# Patient Record
Sex: FEMALE | Race: WHITE | NOT HISPANIC OR LATINO | Employment: FULL TIME | URBAN - METROPOLITAN AREA
[De-identification: names, ages, dates, MRNs, and addresses within clinical notes are randomized per-mention and may not be internally consistent; named-entity substitution may affect disease eponyms.]

---

## 2017-10-03 ENCOUNTER — HOSPITAL ENCOUNTER (EMERGENCY)
Facility: MEDICAL CENTER | Age: 22
End: 2017-10-03
Attending: EMERGENCY MEDICINE
Payer: COMMERCIAL

## 2017-10-03 VITALS
HEART RATE: 76 BPM | DIASTOLIC BLOOD PRESSURE: 85 MMHG | BODY MASS INDEX: 33.31 KG/M2 | HEIGHT: 64 IN | RESPIRATION RATE: 16 BRPM | TEMPERATURE: 96.6 F | WEIGHT: 195.11 LBS | OXYGEN SATURATION: 94 % | SYSTOLIC BLOOD PRESSURE: 135 MMHG

## 2017-10-03 DIAGNOSIS — M79.602 PAIN OF LEFT UPPER EXTREMITY: ICD-10-CM

## 2017-10-03 PROCEDURE — 700111 HCHG RX REV CODE 636 W/ 250 OVERRIDE (IP): Performed by: EMERGENCY MEDICINE

## 2017-10-03 PROCEDURE — 99284 EMERGENCY DEPT VISIT MOD MDM: CPT

## 2017-10-03 PROCEDURE — 93005 ELECTROCARDIOGRAM TRACING: CPT | Performed by: EMERGENCY MEDICINE

## 2017-10-03 PROCEDURE — 96372 THER/PROPH/DIAG INJ SC/IM: CPT

## 2017-10-03 RX ORDER — KETOROLAC TROMETHAMINE 10 MG/1
10 TABLET, FILM COATED ORAL EVERY 6 HOURS PRN
Qty: 22 TAB | Refills: 0 | Status: SHIPPED | OUTPATIENT
Start: 2017-10-03 | End: 2020-12-09

## 2017-10-03 RX ORDER — KETOROLAC TROMETHAMINE 30 MG/ML
60 INJECTION, SOLUTION INTRAMUSCULAR; INTRAVENOUS ONCE
Status: COMPLETED | OUTPATIENT
Start: 2017-10-03 | End: 2017-10-03

## 2017-10-03 RX ORDER — HYDROCODONE BITARTRATE AND ACETAMINOPHEN 5; 325 MG/1; MG/1
1-2 TABLET ORAL EVERY 6 HOURS PRN
Qty: 10 TAB | Refills: 0 | Status: SHIPPED | OUTPATIENT
Start: 2017-10-03 | End: 2020-12-09

## 2017-10-03 RX ADMIN — KETOROLAC TROMETHAMINE 60 MG: 30 INJECTION, SOLUTION INTRAMUSCULAR at 05:08

## 2017-10-03 NOTE — ED NOTES
Chief Complaint   Patient presents with   • Arm Pain     left.  increases with movement.  denies recent trauma.     Triage process explained to patient.  Pt back to waiting room.  Pt instructed to inform RN if any changes or questions arise.

## 2017-10-03 NOTE — ED PROVIDER NOTES
ED Provider Note    CHIEF COMPLAINT  Chief Complaint   Patient presents with   • Arm Pain     left.  increases with movement.  denies recent trauma.       HPI  Pippa Mcgill is a 22 y.o. female who presentsWith arm pain, left arm pain since 2 AM this morning, woke her up. Pain was severe dull, not worse with motion, no neck pain. No similar episodes. No chest pain or shortness of breath no fever no chills no diaphoresis. 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 Denies any neck pain or arm injury at that time    REVIEW OF SYSTEMS  See HPI for further details. History of asthma, depression, sympathetic reflex dystrophy All other systems are negative.     PAST MEDICAL HISTORY  Past Medical History:   Diagnosis Date   • ASTHMA     with exercise    • Pain    • Premature baby     33 wks   • Psychiatric problem     depression/anxiety   • Sympathetic reflex dystrophy        FAMILY HISTORY  Family History   Problem Relation Age of Onset   • Non-contributory Neg Hx        SOCIAL HISTORY  Social History     Social History   • Marital status: Single     Spouse name: N/A   • Number of children: N/A   • Years of education: N/A     Social History Main Topics   • Smoking status: Never Smoker   • Smokeless tobacco: Never Used   • Alcohol use Yes      Comment: occasionally    • Drug use: No   • Sexual activity: Not on file     Other Topics Concern   • Not on file     Social History Narrative   • No narrative on file       SURGICAL HISTORY  Past Surgical History:   Procedure Laterality Date   • COLONOSCOPY  6/14/2012    Performed by MARIAH BIRMINGHAM at SURGERY SAME DAY [facility] ORS   • KNEE ARTHROSCOPY  12/22/2011    Performed by PRAVIN ANG at SURGERY [facility] ORS   • TIBIAL OSTEOTOMY  12/22/2011    Performed by PRAVIN ANG at [facility] ORS   • LIGAMENT RECONSTRUCTION  12/22/2011    Performed by PRAVIN ANG at [facility] ORS   • OTHER ORTHOPEDIC SURGERY    "      CURRENT MEDICATIONS  Home Medications     Reviewed by Yue Wright R.N. (Registered Nurse) on 10/03/17 at 0427  Med List Status: Partial   Medication Last Dose Status   hydrocodone-acetaminophen (NORCO) 5-325 MG TABS per tablet  Active   ibuprofen (MOTRIN) 200 MG TABS Not Taking Active   oxymorphone (OPANA) 10 MG tablet 7/6/2014 Active                ALLERGIES  Allergies   Allergen Reactions   • Nkda [No Known Drug Allergy]        PHYSICAL EXAM  VITAL SIGNS: /85   Pulse 76   Temp 35.9 °C (96.6 °F)   Resp 15   Ht 1.626 m (5' 4\")   Wt 88.5 kg (195 lb 1.7 oz)   SpO2 94%   BMI 33.49 kg/m²   Constitutional: Well developed, Well nourished, No acute distress, Non-toxic appearance.   Extremities: Intact distal pulses, No edema, No tenderness, No cyanosis, No clubbing.   Musculoskeletal: Good range of motion in all major joints. No tenderness to palpation or major deformities noted.   Neurologic: Alert & oriented x 3, Normal motor function, Normal sensory function, No focal deficits noted.   Psychiatric: Affect normal, Judgment normal, Mood normal.   EKG Interpretation    Interpreted by me    Rhythm: normal sinus   Rate: normal  Axis: normal  Ectopy: none  Conduction: normal  ST Segments: no acute change  T Waves: no acute change  Q Waves: none    Clinical Impression: I do not have an old EKG to compare to      RADIOLOGY/PROCEDURES      COURSE & MEDICAL DECISION MAKING  Pertinent Labs & Imaging studies reviewed. (See chart for details)    She woke up with arm pain last night, no chest pain. Does have a brother and uncles with heart disease and EKG will be done.. I think this is probably, most likely neuropathy. Will be treated, with Norco, Toradol,     FINAL IMPRESSION  1.   1. Pain of left upper extremity        2.   3.     Disposition  Discharge instructions are understood. This patient is to return if fever vomiting or no better in 12 hours. Follow up with the [facility] clinic or private physician. " Information sheets onUpper arm pain, neuropathy.  Electronically signed by: Julian Stafford, 10/3/2017 4:49 AM

## 2017-10-03 NOTE — ED NOTES
Patient was educated on discharge instructions.  Patient was informed about diagnosis, symptom management, risks, and home care instructions.  Patient verbalized understanding and signed discharge instructions. Prescriptions handed to patient. Copy of discharge instructions in chart.  Patient ambulated out with steady gait.  Patient has personal belongings .

## 2017-10-03 NOTE — DISCHARGE INSTRUCTIONS
Musculoskeletal Pain  Musculoskeletal pain is muscle and eric aches and pains. These pains can occur in any part of the body. Your caregiver may treat you without knowing the cause of the pain. They may treat you if blood or urine tests, X-rays, and other tests were normal.   CAUSES  There is often not a definite cause or reason for these pains. These pains may be caused by a type of germ (virus). The discomfort may also come from overuse. Overuse includes working out too hard when your body is not fit. Eric aches also come from weather changes. Bone is sensitive to atmospheric pressure changes.  HOME CARE INSTRUCTIONS   · Ask when your test results will be ready. Make sure you get your test results.  · Only take over-the-counter or prescription medicines for pain, discomfort, or fever as directed by your caregiver. If you were given medications for your condition, do not drive, operate machinery or power tools, or sign legal documents for 24 hours. Do not drink alcohol. Do not take sleeping pills or other medications that may interfere with treatment.  · Continue all activities unless the activities cause more pain. When the pain lessens, slowly resume normal activities. Gradually increase the intensity and duration of the activities or exercise.  · During periods of severe pain, bed rest may be helpful. Lay or sit in any position that is comfortable.  · Putting ice on the injured area.  ¨ Put ice in a bag.  ¨ Place a towel between your skin and the bag.  ¨ Leave the ice on for 15 to 20 minutes, 3 to 4 times a day.  · Follow up with your caregiver for continued problems and no reason can be found for the pain. If the pain becomes worse or does not go away, it may be necessary to repeat tests or do additional testing. Your caregiver may need to look further for a possible cause.  SEEK IMMEDIATE MEDICAL CARE IF:  · You have pain that is getting worse and is not relieved by medications.  · You develop chest pain  that is associated with shortness or breath, sweating, feeling sick to your stomach (nauseous), or throw up (vomit).  · Your pain becomes localized to the abdomen.  · You develop any new symptoms that seem different or that concern you.  MAKE SURE YOU:   · Understand these instructions.  · Will watch your condition.  · Will get help right away if you are not doing well or get worse.     This information is not intended to replace advice given to you by your health care provider. Make sure you discuss any questions you have with your health care provider.     Document Released: 12/18/2006 Document Revised: 03/11/2013 Document Reviewed: 08/22/2014  [employer] Interactive Patient Education ©2016 [employer]  Return if fever, vomiting or if no better in 12 hours.

## 2017-10-20 LAB — EKG IMPRESSION: NORMAL

## 2018-01-28 ENCOUNTER — OFFICE VISIT (OUTPATIENT)
Dept: URGENT CARE | Facility: CLINIC | Age: 23
End: 2018-01-28
Payer: COMMERCIAL

## 2018-01-28 VITALS
HEART RATE: 87 BPM | DIASTOLIC BLOOD PRESSURE: 64 MMHG | SYSTOLIC BLOOD PRESSURE: 102 MMHG | BODY MASS INDEX: 32.44 KG/M2 | TEMPERATURE: 98 F | WEIGHT: 190 LBS | RESPIRATION RATE: 14 BRPM | OXYGEN SATURATION: 96 % | HEIGHT: 64 IN

## 2018-01-28 DIAGNOSIS — L50.9 URTICARIA: ICD-10-CM

## 2018-01-28 PROCEDURE — 99203 OFFICE O/P NEW LOW 30 MIN: CPT | Performed by: FAMILY MEDICINE

## 2018-01-28 RX ORDER — METHYLPREDNISOLONE 4 MG/1
TABLET ORAL
Qty: 21 TAB | Refills: 0 | Status: SHIPPED | OUTPATIENT
Start: 2018-01-28 | End: 2020-12-09

## 2018-01-28 RX ORDER — DEXAMETHASONE SODIUM PHOSPHATE 4 MG/ML
2 INJECTION, SOLUTION INTRA-ARTICULAR; INTRALESIONAL; INTRAMUSCULAR; INTRAVENOUS; SOFT TISSUE ONCE
Status: COMPLETED | OUTPATIENT
Start: 2018-01-28 | End: 2018-01-28

## 2018-01-28 RX ADMIN — DEXAMETHASONE SODIUM PHOSPHATE 2 MG: 4 INJECTION, SOLUTION INTRA-ARTICULAR; INTRALESIONAL; INTRAMUSCULAR; INTRAVENOUS; SOFT TISSUE at 11:23

## 2018-01-28 ASSESSMENT — ENCOUNTER SYMPTOMS
VOMITING: 0
SORE THROAT: 0
SPUTUM PRODUCTION: 0
COUGH: 0
SINUS PAIN: 0
EYE REDNESS: 0
PALPITATIONS: 0
CHILLS: 0
DIARRHEA: 0
EYE DISCHARGE: 0
FEVER: 0
NAUSEA: 0
ABDOMINAL PAIN: 0

## 2018-01-28 NOTE — PATIENT INSTRUCTIONS
Rash  A rash is a change in the color or texture of the skin. There are many different types of rashes. You may have other problems that accompany your rash.  CAUSES   · Infections.  · Allergic reactions. This can include allergies to pets or foods.  · Certain medicines.  · Exposure to certain chemicals, soaps, or cosmetics.  · Heat.  · Exposure to poisonous plants.  · Tumors, both cancerous and noncancerous.  SYMPTOMS   · Redness.  · Scaly skin.  · Itchy skin.  · Dry or cracked skin.  · Bumps.  · Blisters.  · Pain.  DIAGNOSIS   Your caregiver may do a physical exam to determine what type of rash you have. A skin sample (biopsy) may be taken and examined under a microscope.  TREATMENT   Treatment depends on the type of rash you have. Your caregiver may prescribe certain medicines. For serious conditions, you may need to see a skin doctor (dermatologist).  HOME CARE INSTRUCTIONS   · Avoid the substance that caused your rash.  · Do not scratch your rash. This can cause infection.  · You may take cool baths to help stop itching.  · Only take over-the-counter or prescription medicines as directed by your caregiver.  · Keep all follow-up appointments as directed by your caregiver.  SEEK IMMEDIATE MEDICAL CARE IF:  · You have increasing pain, swelling, or redness.  · You have a fever.  · You have new or severe symptoms.  · You have body aches, diarrhea, or vomiting.  · Your rash is not better after 3 days.  MAKE SURE YOU:  · Understand these instructions.  · Will watch your condition.  · Will get help right away if you are not doing well or get worse.     This information is not intended to replace advice given to you by your health care provider. Make sure you discuss any questions you have with your health care provider.     Document Released: 12/08/2003 Document Revised: 01/08/2016 Document Reviewed: 10/01/2012  [employer] Interactive Patient Education ©2016 [employer]

## 2018-01-28 NOTE — PROGRESS NOTES
Subjective:      Pippa Mcgill is a 22 y.o. female who presents with Urticaria (x 3-4 days all over body)            Subjective:     Pippa Mcgill is a 22 y.o. female who presents for evaluation of rash. Rash started 3 days ago. Initial distribution include arms , trunk and legs.  Rash has changed over time.  Rash pruritic, appears to be allergic reaction. Associated symptoms: no associated symptoms. Patient denies: fever, cough, congestion, sore throat, arthralgia, nausea , vomiting, myalgia. Patient has not had previous evaluation of rash. Patient has not had previous treatment. Patient has not had contacts with similar rash. Patient has had new exposures. They have changed washing detergent last week.      Past Medical History:  No date: ASTHMA      Comment: with exercise   No date: Pain  No date: Premature baby      Comment: 33 wks  No date: Psychiatric problem      Comment: depression/anxiety  No date: Sympathetic reflex dystrophy  There are no active problems to display for this patient.    Past Surgical History:  6/14/2012: COLONOSCOPY      Comment: Performed by MARIAH BIRMINGHAM at SURGERY SAME                DAY [facility] ORS  12/22/2011: KNEE ARTHROSCOPY      Comment: Performed by PRAVIN ANG at SURGERY [facility] ORS  12/22/2011: TIBIAL OSTEOTOMY      Comment: Performed by PRAVIN ANG at [facility] ORS  12/22/2011: LIGAMENT RECONSTRUCTION      Comment: Performed by PRAVIN ANG at [facility] ORS  No date: OTHER ORTHOPEDIC SURGERY  Review of patient's family history indicates:    Non-contributory               Neg Hx                    Social History    Marital status: Single              Spouse name:                       Years of education:                 Number of children:               Social History Main Topics    Smoking status: Never Smoker                                                                Smokeless tobacco: Never  "Used                        Alcohol use: Yes                Comment: occasionally     Drug use: No              Current Outpatient Prescriptions:  DiphenhydrAMINE HCl (BENADRYL ALLERGY PO), Take  by mouth., Disp: , Rfl:   ketorolac (TORADOL) 10 MG Tab, Take 1 Tab by mouth every 6 hours as needed for Mild Pain for up to 22 doses., Disp: 22 Tab, Rfl: 0  hydrocodone-acetaminophen (NORCO) 5-325 MG Tab per tablet, Take 1-2 Tabs by mouth every 6 hours as needed., Disp: 10 Tab, Rfl: 0  oxymorphone (OPANA) 10 MG tablet, Take 10 mg by mouth every four hours as needed., Disp: , Rfl:   hydrocodone-acetaminophen (NORCO) 5-325 MG TABS per tablet, Take 1 Tab by mouth every four hours as needed., Disp: 15 Each, Rfl: 0  ibuprofen (MOTRIN) 200 MG TABS, Take 200 mg by mouth every 6 hours as needed., Disp: , Rfl:     No current facility-administered medications for this visit.      -- Nkda (No Known Drug Allergy)                     Review of Systems   Constitutional: Negative for chills, fever and malaise/fatigue.   HENT: Negative for congestion, sinus pain and sore throat.    Eyes: Negative for discharge and redness.   Respiratory: Negative for cough and sputum production.    Cardiovascular: Negative for chest pain and palpitations.   Gastrointestinal: Negative for abdominal pain, diarrhea, nausea and vomiting.   Skin: Positive for itching and rash.          Objective:     /64   Pulse 87   Temp 36.7 °C (98 °F)   Resp 14   Ht 1.626 m (5' 4\")   Wt 86.2 kg (190 lb)   SpO2 96%   BMI 32.61 kg/m²      Physical Exam   Constitutional: She is oriented to person, place, and time. She appears well-developed and well-nourished.   HENT:   Head: Normocephalic.   Right Ear: External ear normal.   Left Ear: External ear normal.   Eyes: EOM are normal. Pupils are equal, round, and reactive to light. Right eye exhibits no discharge. Left eye exhibits no discharge.   Neck: Normal range of motion. Neck supple.   Cardiovascular: Normal " rate, regular rhythm and normal heart sounds.    Pulmonary/Chest: Effort normal and breath sounds normal.   Abdominal: Soft. Bowel sounds are normal.   Neurological: She is alert and oriented to person, place, and time.   Skin: Skin is warm. Rash noted. No purpura noted. Rash is papular and urticarial. Rash is not vesicular.               Assessment/Plan:     Assessment:    Urticaria     Plan:    Aveeno baths  Benadryl prn for itching.  Information on the above diagnosis was given to the patient.  Observe for signs of superimposed infection and systemic symptoms  Rx: medrol dose pack

## 2019-01-13 ENCOUNTER — OFFICE VISIT (OUTPATIENT)
Dept: URGENT CARE | Facility: CLINIC | Age: 24
End: 2019-01-13
Payer: COMMERCIAL

## 2019-01-13 VITALS
DIASTOLIC BLOOD PRESSURE: 76 MMHG | SYSTOLIC BLOOD PRESSURE: 118 MMHG | RESPIRATION RATE: 16 BRPM | HEART RATE: 82 BPM | TEMPERATURE: 98.4 F | OXYGEN SATURATION: 97 %

## 2019-01-13 DIAGNOSIS — T78.3XXA ANGIOEDEMA OF LIPS, INITIAL ENCOUNTER: ICD-10-CM

## 2019-01-13 PROCEDURE — 99203 OFFICE O/P NEW LOW 30 MIN: CPT | Performed by: NURSE PRACTITIONER

## 2019-01-13 RX ORDER — METHYLPREDNISOLONE 4 MG/1
TABLET ORAL
Qty: 1 KIT | Refills: 0 | Status: SHIPPED | OUTPATIENT
Start: 2019-01-13 | End: 2020-12-09

## 2019-01-13 ASSESSMENT — ENCOUNTER SYMPTOMS
CHILLS: 0
FACIAL SWELLING: 1
FEVER: 0

## 2019-01-13 NOTE — PROGRESS NOTES
Subjective:      Pippa Mcgill is a 23 y.o. female who presents with Facial Swelling (x1 day, lip swelling, has gone down since but still feel inflamed)    Past Medical History:   Diagnosis Date   • ASTHMA     with exercise    • Pain    • Premature baby     33 wks   • Psychiatric problem     depression/anxiety   • Sympathetic reflex dystrophy      Social History     Social History   • Marital status: Single     Spouse name: N/A   • Number of children: N/A   • Years of education: N/A     Occupational History   • Not on file.     Social History Main Topics   • Smoking status: Never Smoker   • Smokeless tobacco: Never Used   • Alcohol use Yes      Comment: occasionally    • Drug use: No   • Sexual activity: Not on file     Other Topics Concern   • Not on file     Social History Narrative   • No narrative on file     Family History   Problem Relation Age of Onset   • Non-contributory Neg Hx        Allergies: Nkda [no known drug allergy]    Patient is a 23-year-old female who presents today with complaint of intermittent swelling of her lips over the last 2 days.  States day before yesterday she woke up during the night with her lip swollen.  She tried putting ice on them and this seemed to help.  It happened again yesterday but not to the same extreme.  She denies any other rash itching or hives.  No tightness in her throat or chest, no difficulty breathing, and no swelling of the tongue.  States this did start after she began using some Chapstick.  States she has since stopped using that.          Facial Swelling   This is a new problem. The current episode started in the past 7 days. The problem occurs intermittently. The problem has been gradually improving. Pertinent negatives include no chills or fever. Nothing aggravates the symptoms. She has tried nothing for the symptoms. The treatment provided no relief.       Review of Systems   Constitutional: Positive for malaise/fatigue. Negative for chills and fever.    HENT: Positive for facial swelling.         Facial swelling   Skin: Negative.    All other systems reviewed and are negative.         Objective:     There were no vitals taken for this visit.     Physical Exam   Constitutional: She is oriented to person, place, and time. She appears well-developed and well-nourished.   HENT:   Head: Normocephalic.   Nose: Nose normal.   Mouth/Throat: Oropharynx is clear and moist. No oropharyngeal exudate.   Lips do not appear to be swollen at this time.   Eyes: EOM are normal.   Neck: Normal range of motion. Neck supple.   Cardiovascular: Normal rate and regular rhythm.    Pulmonary/Chest: Effort normal and breath sounds normal.   Neurological: She is alert and oriented to person, place, and time.   Skin: Skin is warm and dry. No rash noted.   Psychiatric: She has a normal mood and affect. Her behavior is normal.   Vitals reviewed.    Discussed with patient that I suspect she may have experienced angioedema which is an allergy type response.  I have advised her to start taking antihistamines and I will send a Medrol Dosepak to the patient's pharmacy if this occurs again I have asked her to start taking it.  Strict ER precautions given for swelling of the tongue or tightness in throat or chest or difficulty breathing.  Patient verbalized understanding and agreement with plan of care.          Assessment/Plan:   Angioedema, intermittent    Antihistamine of choic otc  Medrol dose lindsay  ER precautions for swelling of tongue, tightness in throat or SOB    There are no diagnoses linked to this encounter.

## 2019-02-08 ENCOUNTER — HOSPITAL ENCOUNTER (EMERGENCY)
Facility: MEDICAL CENTER | Age: 24
End: 2019-02-15
Payer: COMMERCIAL

## 2019-12-23 ENCOUNTER — OFFICE VISIT (OUTPATIENT)
Dept: URGENT CARE | Facility: CLINIC | Age: 24
End: 2019-12-23
Payer: COMMERCIAL

## 2019-12-23 VITALS
TEMPERATURE: 97.6 F | HEIGHT: 64 IN | OXYGEN SATURATION: 97 % | SYSTOLIC BLOOD PRESSURE: 120 MMHG | WEIGHT: 213 LBS | HEART RATE: 83 BPM | DIASTOLIC BLOOD PRESSURE: 76 MMHG | RESPIRATION RATE: 16 BRPM | BODY MASS INDEX: 36.37 KG/M2

## 2019-12-23 DIAGNOSIS — R21 RASH: ICD-10-CM

## 2019-12-23 DIAGNOSIS — L23.9 ALLERGIC CONTACT DERMATITIS, UNSPECIFIED TRIGGER: ICD-10-CM

## 2019-12-23 PROCEDURE — 99214 OFFICE O/P EST MOD 30 MIN: CPT | Performed by: NURSE PRACTITIONER

## 2019-12-23 RX ORDER — TRIAMCINOLONE ACETONIDE 1 MG/G
1 CREAM TOPICAL 2 TIMES DAILY
Qty: 1 TUBE | Refills: 0 | Status: SHIPPED | OUTPATIENT
Start: 2019-12-23 | End: 2020-12-09

## 2019-12-23 ASSESSMENT — ENCOUNTER SYMPTOMS
FATIGUE: 0
DIZZINESS: 0
SHORTNESS OF BREATH: 0
CHILLS: 0
FEVER: 0
VOMITING: 0
RHINORRHEA: 0
MYALGIAS: 0
EYE PAIN: 0
NAUSEA: 0
DIARRHEA: 0
SORE THROAT: 0

## 2019-12-23 NOTE — PROGRESS NOTES
"Subjective:   Pippa Mcgill  is a 24 y.o. female who presents for Rash (x2 days, rash under both underarms. itchy and burning. Started a new deodrant)        Rash   This is a new problem. Episode onset: 2 days. The problem is unchanged. The affected locations include the left axilla and right axilla. The rash is characterized by redness and itchiness. Associated with: Started a new deodorant. Pertinent negatives include no congestion, diarrhea, eye pain, facial edema, fatigue, fever, rhinorrhea, shortness of breath, sore throat or vomiting. Past treatments include nothing. The treatment provided no relief. Her past medical history is significant for allergies. There is no history of eczema.     Review of Systems   Constitutional: Negative for chills, fatigue and fever.   HENT: Negative for congestion, rhinorrhea and sore throat.    Eyes: Negative for pain.   Respiratory: Negative for shortness of breath.    Cardiovascular: Negative for chest pain.   Gastrointestinal: Negative for diarrhea, nausea and vomiting.   Genitourinary: Negative for hematuria.   Musculoskeletal: Negative for myalgias.   Skin: Positive for itching and rash.   Neurological: Negative for dizziness.     Allergies   Allergen Reactions   • Nkda [No Known Drug Allergy]       Objective:   /76   Pulse 83   Temp 36.4 °C (97.6 °F) (Temporal)   Resp 16   Ht 1.626 m (5' 4\")   Wt 96.6 kg (213 lb)   SpO2 97%   BMI 36.56 kg/m²   Physical Exam  Vitals signs and nursing note reviewed.   Constitutional:       General: She is not in acute distress.     Appearance: She is well-developed.   HENT:      Head: Normocephalic and atraumatic.   Eyes:      Conjunctiva/sclera: Conjunctivae normal.      Pupils: Pupils are equal, round, and reactive to light.   Cardiovascular:      Rate and Rhythm: Normal rate and regular rhythm.      Heart sounds: No murmur.   Pulmonary:      Effort: Pulmonary effort is normal. No respiratory distress.      Breath sounds: " Normal breath sounds.   Abdominal:      General: There is no distension.      Palpations: Abdomen is soft.      Tenderness: There is no tenderness.   Musculoskeletal: Normal range of motion.   Skin:     General: Skin is warm and dry.      Findings: Rash present. Rash is macular and urticarial.   Neurological:      General: No focal deficit present.      Mental Status: She is alert and oriented to person, place, and time. Mental status is at baseline.      Gait: Gait (gait at baseline) normal.   Psychiatric:         Judgment: Judgment normal.           Assessment/Plan:     1. Allergic contact dermatitis, unspecified trigger  triamcinolone acetonide (KENALOG) 0.1 % Cream   2. Rash  triamcinolone acetonide (KENALOG) 0.1 % Cream     Encourage patient to use over-the-counter Zyrtec.  Advised to avoid deodorant.  Patient given precautionary s/sx that mandate immediate follow up and evaluation in the ED. Advised of risks of not doing so.    DDX, Supportive care, and indications for immediate follow-up discussed with patient.    Instructed to return to clinic or nearest emergency department if we are not available for any change in condition, further concerns, or worsening of symptoms.    The patient demonstrated a good understanding and agreed with the treatment plan.

## 2020-10-29 ENCOUNTER — NURSE TRIAGE (OUTPATIENT)
Dept: HEALTH INFORMATION MANAGEMENT | Facility: OTHER | Age: 25
End: 2020-10-29

## 2020-10-29 NOTE — TELEPHONE ENCOUNTER
1. Caller Name: bud               Call Back Number: [phone]   [facility] PCP or Specialty Provider: Johanna Brown        2.  In the last two weeks, has the patient had any new or worsening symptoms (not explained by alternative diagnosis)? Yes, the patient reports the following COVID-19 consistent symptoms: cough.    3.  Does patient have any comoribidities? None     4.  Has the patient traveled in the last 14 days OR had any known contact with someone who is suspected or confirmed to have COVID-19?  Yes, 10/25/2020 spent 4 hours with covid positive person at family member home.    5. POTENTIAL PUI (LOW): Advised to perform self care, monitor for worsening symptoms and to call back in 3 days if no improvement. Instructed to self isolate and contact [facility] at [phone]    Information provided on testing site    Note routed to [facility] Provider: ORLY only.         Reason for Disposition  • [1] COVID-19 EXPOSURE (Close Contact) AND [2] within last 14 days AND [3] NO cough, fever, or breathing difficulty    Additional Information  • Negative: SEVERE difficulty breathing (e.g., struggling for each breath, speak in single words, bluish lips)  • Negative: Sounds like a life-threatening emergency to the triager  • Negative: [1] Adult has symptoms of COVID-19 (fever, cough, or SOB) AND [2] lab test positive  • Negative: [1] Adult has symptoms of COVID-19 (fever, cough or SOB) AND [2] major community spread where patient lives AND [3] testing not being done for mild symptoms  • Negative: [1] Difficulty breathing (shortness of breath) occurs AND [2] onset > 14 days after COVID-19 EXPOSURE (Close Contact) AND [3] no major community spread  • Negative: [1] Cough occurs AND [2] onset > 14 days after COVID-19 EXPOSURE AND [3] no major community spread  • Negative: [1] Common cold symptoms AND [2] onset > 14 days after COVID-19 EXPOSURE AND [3] no major community spread  • Negative: [1] Difficulty breathing occurs AND [2] within  "14 days of COVID-19 EXPOSURE (Close Contact)  • Negative: Patient sounds very sick or weak to the triager  • Negative: [1] Fever (or feeling feverish) OR cough AND [2] within 14 Days of COVID-19 EXPOSURE (Close Contact)  • Negative: [1] Fever (or feeling feverish) OR cough occurs AND [2] within 14 days of travel from another country (international travel)  • Negative: [1] Fever (or feeling feverish) OR cough occurs AND [2] within 14 days of travel from a city or area with major community spread  • Negative: [1] Fever (or feeling feverish) OR cough occurs AND [2] living in area with major community spread AND [3] testing being done in the community for symptoms  • Negative: [1] Mild body aches, chills, diarrhea, headache, runny nose, or sore throat AND [2] within 14 days of COVID-19 EXPOSURE  • Negative: [1] COVID-19 EXPOSURE within last 14 days AND [2] NO cough, fever, or breathing difficulty AND [3] exposed person is a healthcare worker who was NOT using all recommended personal protective equipment (i.e., a respirator-N95 mask, eye protection, gloves, and gown)    Answer Assessment - Initial Assessment Questions  1. CLOSE CONTACT: \"Who is the person with the confirmed or suspected COVID-19 infection that you were exposed to?\"      confirmed  2. PLACE of CONTACT: \"Where were you when you were exposed to COVID-19?\" (e.g., home, school, medical waiting room; which city?)      Family members home  3. TYPE of CONTACT: \"How much contact was there?\" (e.g., sitting next to, live in same house, work in same office, same building)      Sitting by each other  4. DURATION of CONTACT: \"How long were you in contact with the COVID-19 patient?\" (e.g., a few seconds, passed by person, a few minutes, live with the patient)     About 4 hours  5. DATE of CONTACT: \"When did you have contact with a COVID-19 patient?\" (e.g., how many days ago)   10/25  6. TRAVEL: \"Have you traveled out of the country recently?\" If so, \"When and " "where?\"      * Also ask about out-of-state travel, since the [facility] has identified some high risk cities for community spread in the .      * Note: Travel becomes less relevant if there is widespread community transmission where the patient lives.  no  7. COMMUNITY SPREAD: \"Are there lots of cases of COVID-19 (community spread) where you live?\" (See public health department website, if unsure)    * MAJOR community spread: high number of cases; numbers of cases are increasing; many people hospitalized.    * MINOR community spread: low number of cases; not increasing; few or no people hospitalized      yes  8. SYMPTOMS: \"Do you have any symptoms?\" (e.g., fever, cough, breathing difficulty)      no  9. PREGNANCY OR POSTPARTUM: \"Is there any chance you are pregnant?\" \"When was your last menstrual period?\" \"Did you deliver in the last 2 weeks?\"      no  10. HIGH RISK: \"Do you have any heart or lung problems? Do you have a weak immune system?\" (e.g., CHF, COPD, asthma, HIV positive, chemotherapy, renal failure, diabetes mellitus, sickle cell anemia)        no    Protocols used: CORONAVIRUS (COVID-19) EXPOSURE-A-OH      "

## 2020-12-09 ENCOUNTER — OFFICE VISIT (OUTPATIENT)
Dept: URGENT CARE | Facility: CLINIC | Age: 25
End: 2020-12-09
Payer: COMMERCIAL

## 2020-12-09 ENCOUNTER — HOSPITAL ENCOUNTER (OUTPATIENT)
Facility: MEDICAL CENTER | Age: 25
End: 2020-12-09
Attending: PHYSICIAN ASSISTANT
Payer: COMMERCIAL

## 2020-12-09 VITALS
HEIGHT: 64 IN | WEIGHT: 203 LBS | SYSTOLIC BLOOD PRESSURE: 118 MMHG | HEART RATE: 104 BPM | TEMPERATURE: 98 F | RESPIRATION RATE: 17 BRPM | DIASTOLIC BLOOD PRESSURE: 70 MMHG | BODY MASS INDEX: 34.66 KG/M2 | OXYGEN SATURATION: 97 %

## 2020-12-09 DIAGNOSIS — Z20.822 SUSPECTED COVID-19 VIRUS INFECTION: ICD-10-CM

## 2020-12-09 DIAGNOSIS — R52 GENERALIZED BODY ACHES: ICD-10-CM

## 2020-12-09 DIAGNOSIS — R51.9 ACUTE NONINTRACTABLE HEADACHE, UNSPECIFIED HEADACHE TYPE: ICD-10-CM

## 2020-12-09 DIAGNOSIS — R05.9 COUGH: ICD-10-CM

## 2020-12-09 DIAGNOSIS — R50.9 CHILLS WITH FEVER: ICD-10-CM

## 2020-12-09 PROCEDURE — 99214 OFFICE O/P EST MOD 30 MIN: CPT | Mod: CS | Performed by: PHYSICIAN ASSISTANT

## 2020-12-09 PROCEDURE — U0003 INFECTIOUS AGENT DETECTION BY NUCLEIC ACID (DNA OR RNA); SEVERE ACUTE RESPIRATORY SYNDROME CORONAVIRUS 2 (SARS-COV-2) (CORONAVIRUS DISEASE [COVID-19]), AMPLIFIED PROBE TECHNIQUE, MAKING USE OF HIGH THROUGHPUT TECHNOLOGIES AS DESCRIBED BY CMS-2020-01-R: HCPCS

## 2020-12-09 ASSESSMENT — ENCOUNTER SYMPTOMS
DIARRHEA: 0
COUGH: 1
FEVER: 1
SORE THROAT: 1
WHEEZING: 0
VOMITING: 0
SHORTNESS OF BREATH: 0
NAUSEA: 0
CHILLS: 1
HEADACHES: 1
MYALGIAS: 1
EYE DISCHARGE: 0
SPUTUM PRODUCTION: 0

## 2020-12-10 NOTE — PROGRESS NOTES
Subjective:      Pippa Mcgill is a 25 y.o. female who presents with Cough (body aches, headache, chills, x1day)    Medications:    • BENADRYL ALLERGY PO  • ibuprofen Tabs    Allergies: Nkda [no known drug allergy]    Problem List: Pippa Mcgill does not have a problem list on file.    Surgical History:  Past Surgical History:   Procedure Laterality Date   • COLONOSCOPY  6/14/2012    Performed by MARIAH BIRMINGHAM at SURGERY SAME DAY [facility] ORS   • KNEE ARTHROSCOPY  12/22/2011    Performed by PRAVIN ANG at SURGERY [facility] ORS   • TIBIAL OSTEOTOMY  12/22/2011    Performed by PRAVIN ANG at SURGERY [facility] ORS   • LIGAMENT RECONSTRUCTION  12/22/2011    Performed by PRAVIN ANG at SURGERY [facility] ORS   • OTHER ORTHOPEDIC SURGERY         Past Social Hx: Pippa Mcgill  reports that she has never smoked. She has never used smokeless tobacco. She reports current alcohol use. She reports that she does not use drugs.     Past Family Hx:  Pippa Mcgill family history is not on file.     Problem list, medications, and allergies reviewed by myself today in Epic.         Patient presents with:  Cough: body aches, headache, chills, x1day.  Pt works outside of the home, does wear a mask.  Patient has had a number of people at work test positive for Covid.  Patient is requesting a Covid test based on her symptoms.  Patient has been taking over-the-counter Tylenol DayQuil and NyQuil with little relief.        Cough  Associated symptoms include chills, ear pain, a fever, headaches, myalgias and a sore throat. Pertinent negatives include no chest pain, shortness of breath or wheezing. Exacerbated by: exertion. She has tried OTC cough suppressant for the symptoms. The treatment provided no relief. Her past medical history is significant for bronchitis and environmental allergies. There is no history of asthma.       Review of Systems   Constitutional: Positive for chills, fever and malaise/fatigue.  "  HENT: Positive for congestion, ear pain and sore throat.    Eyes: Negative for discharge.   Respiratory: Positive for cough. Negative for sputum production, shortness of breath and wheezing.    Cardiovascular: Negative for chest pain.   Gastrointestinal: Negative for diarrhea, nausea and vomiting.   Musculoskeletal: Positive for myalgias.   Neurological: Positive for headaches.   Endo/Heme/Allergies: Positive for environmental allergies.   All other systems reviewed and are negative.         Objective:     /70 (BP Location: Left arm, Patient Position: Sitting, BP Cuff Size: Adult)   Pulse (!) 104   Temp 36.7 °C (98 °F) (Temporal)   Resp 17   Ht 1.626 m (5' 4\")   Wt 92.1 kg (203 lb)   LMP 12/04/2020   SpO2 97%   Breastfeeding No   BMI 34.84 kg/m²      Physical Exam  Vitals signs and nursing note reviewed.   Constitutional:       General: She is not in acute distress.     Appearance: Normal appearance. She is well-developed and overweight. She is ill-appearing. She is not toxic-appearing or diaphoretic.   HENT:      Head: Normocephalic and atraumatic.      Right Ear: Tympanic membrane normal.      Left Ear: Tympanic membrane normal.      Nose: Mucosal edema, congestion and rhinorrhea present.      Mouth/Throat:      Mouth: Mucous membranes are moist.      Pharynx: Uvula midline. No oropharyngeal exudate or posterior oropharyngeal erythema.      Tonsils: No tonsillar exudate.   Eyes:      General: Lids are normal.      Extraocular Movements: Extraocular movements intact.      Conjunctiva/sclera:      Right eye: Right conjunctiva is injected.      Left eye: Left conjunctiva is injected.      Pupils: Pupils are equal, round, and reactive to light.   Neck:      Musculoskeletal: Normal range of motion and neck supple.   Cardiovascular:      Rate and Rhythm: Regular rhythm. Tachycardia present.      Pulses: Normal pulses.      Heart sounds: Normal heart sounds.   Pulmonary:      Effort: Pulmonary effort is " normal. No respiratory distress.      Breath sounds: No stridor. No wheezing, rhonchi or rales.   Abdominal:      Palpations: Abdomen is soft.   Musculoskeletal: Normal range of motion.   Skin:     General: Skin is warm and dry.      Capillary Refill: Capillary refill takes less than 2 seconds.   Neurological:      General: No focal deficit present.      Mental Status: She is alert and oriented to person, place, and time.      Gait: Gait normal.   Psychiatric:         Mood and Affect: Mood normal.         Behavior: Behavior is cooperative.              Assessment/Plan:          1. Cough  COVID/SARS COV-2 PCR   2. Generalized body aches  COVID/SARS COV-2 PCR   3. Acute nonintractable headache, unspecified headache type  COVID/SARS COV-2 PCR   4. Chills with fever  COVID/SARS COV-2 PCR   5. Suspected COVID-19 virus infection  COVID/SARS COV-2 PCR     Per protocol for PUI/ISO patients, the patient was evaluated by me while I was wearing PPE.  Per CDC guidelines, patient has been instructed to self quarantine at home for at least 10 days from onset of symptoms and at least 3 full days after resolution of fever/respiratory symptoms.  PT verbalized agreement to do so.       Discussed that I felt this was viral in nature. Did not see any evidence of a bacterial process. Discussed natural progression and sx care.    PT can begin or continue OTC medications, increase fluids and rest until symptoms improve.     PT should follow up with PCP in 1-2 days for re-evaluation if symptoms have not improved.  Discussed red flags and reasons to return to UC or ED.  Pt and/or family verbalized understanding of diagnosis and follow up instructions and was offered informational handout on diagnosis.  PT discharged.

## 2020-12-10 NOTE — PATIENT INSTRUCTIONS
INSTRUCTIONS FOR COVID-19 OR ANY OTHER INFECTIOUS RESPIRATORY ILLNESSES    The Centers for Disease Control and Prevention (CDC) states that early indications for COVID-19 include cough, shortness of breath, difficulty breathing, or at least two of the following symptoms: chills, shaking with chills, muscle pain, headache, sore throat, and loss of taste or smell. Symptoms can range from mild to severe and may appear up to two weeks after exposure to the virus.    The practice of self-isolation and quarantine helps protect the public and your family by  preventing exposure to people who have or may have a contagious disease. Please follow the prevention steps below as based on CDC guidelines:    WHEN TO STOP ISOLATION: Persons with COVID-19 or any other infectious respiratory illness who have symptoms and were advised to care for themselves at home may discontinue home isolation under the following conditions:  · At least 24 hours have passed since recovery defined as resolution of fever without the use of fever-reducing medications; AND,  · Improvement in respiratory symptoms (e.g., cough, shortness of breath); AND,  · At least 10 days have passed since symptoms first appeared and have had no subsequent illness.    MONITOR YOUR SYMPTOMS: If your illness is worsening, seek prompt medical attention. If you have a medical emergency and need to call 911, notify the dispatch personnel that you have, or are being evaluated for confirmed or suspected COVID-19 or another infectious respiratory illness. Wear a facemask if possible.    ACTIVITY RESTRICTION: restrict activities outside your home, except for getting medical care. Do not go to work, school, or public areas. Avoid using public transportation, ride-sharing, or taxis.    SCHEDULED MEDICAL APPOINTMENTS: Notify your provider that you have, or are being evaluated for, confirmed or suspected COVID-19 or another infectious respiratory. This will help the healthcare  provider’s office safely take care of you and keep other people from getting exposed or infected.    FACEMASKS, when to wear: Anytime you are away from your home or around other people or pets. If you are unable to wear one, maintain a minimum of 6 feet distancing from others.    LIVING ENVIRONMENT: Stay in a separate room from other people and pets. If possible, use a separate bathroom, have someone else care for your pets and avoid sharing household items. Any items used should be washed thoroughly with soap and water. Clean all “high-touch” surfaces every day. Use a household cleaning spray or wipe, according to the label instructions. High touch surfaces include (but are not limited to) counters, tabletops, doorknobs, bathroom fixtures, toilets, phones, keyboards, tablets, and bedside tables.     HAND WASHING: Frequently wash hands with soap and water for at least 20 seconds,  especially after blowing your nose, coughing, or sneezing; going to the bathroom; before and after interacting with pets; and before and after eating or preparing food. If hands are visibly dirty use soap and water. If soap and water are not available, use an alcohol-based hand  with at least 60% alcohol. Avoid touching your eyes, nose, and mouth with unwashed hands. Cover your coughs and sneezes with a tissue. Throw used tissues in a lined trash can. Immediately wash your hands.    ACTIVE/FACILITATED SELF-MONITORING: Follow instructions provided by your local health department or health professionals, as appropriate. When working with your local health department check their available hours.    [facility]   Phone Number   [facility] [phone]   [facility] [place] [phone]   [place] Call 211   [place] [phone]     IF YOU HAVE CONFIRMED POSITIVE COVID-19:    Those who have completely recovered from COVID-19 may have immune-boosting antibodies in their plasma--called “convalescent plasma”--that could be  used to treat critically ill COVID19 patients.    [facility] is excited to begin working with [facility] on collecting convalescent plasma from  people who have recovered from COVID-19 as part of a program to treat patients infected with the virus. This FDA-approved “emergency investigational new drug” is a special blood product containing antibodies that may give patients an extra boost to fight the virus.    To be eligible to donate convalescent plasma, you must have a prior COVID-19 diagnosis documented by a laboratory test (or a positive test result for SARS-CoV-2 antibodies) and meet additional eligibility requirements.    If you are interested in donating convalescent plasma or have any additional questions, please contact the [facility] Convalescent Plasma  at [phone] or via e-mail at [email or web address].

## 2020-12-11 DIAGNOSIS — Z20.822 SUSPECTED COVID-19 VIRUS INFECTION: ICD-10-CM

## 2020-12-11 DIAGNOSIS — R05.9 COUGH: ICD-10-CM

## 2020-12-11 DIAGNOSIS — R52 GENERALIZED BODY ACHES: ICD-10-CM

## 2020-12-11 DIAGNOSIS — R50.9 CHILLS WITH FEVER: ICD-10-CM

## 2020-12-11 DIAGNOSIS — R51.9 ACUTE NONINTRACTABLE HEADACHE, UNSPECIFIED HEADACHE TYPE: ICD-10-CM

## 2020-12-11 LAB
COVID ORDER STATUS COVID19: NORMAL
SARS-COV-2 RNA RESP QL NAA+PROBE: NOTDETECTED
SPECIMEN SOURCE: NORMAL

## 2022-03-02 ENCOUNTER — OFFICE VISIT (OUTPATIENT)
Dept: URGENT CARE | Facility: PHYSICIAN GROUP | Age: 27
End: 2022-03-02

## 2022-03-02 VITALS
BODY MASS INDEX: 34.15 KG/M2 | HEIGHT: 64 IN | TEMPERATURE: 97.9 F | HEART RATE: 82 BPM | WEIGHT: 200 LBS | RESPIRATION RATE: 18 BRPM | DIASTOLIC BLOOD PRESSURE: 78 MMHG | SYSTOLIC BLOOD PRESSURE: 120 MMHG | OXYGEN SATURATION: 98 %

## 2022-03-02 DIAGNOSIS — Z02.89 ENCOUNTER FOR EXAMINATION REQUIRED BY DEPARTMENT OF TRANSPORTATION (DOT): ICD-10-CM

## 2022-03-02 PROCEDURE — 7100 PR DOT PHYSICAL: Performed by: EMERGENCY MEDICINE

## 2022-03-02 NOTE — PROGRESS NOTES
Here for [facility] CMV medical examination.  Meets criteria for two-year certificate.

## 2023-02-11 ENCOUNTER — OFFICE VISIT (OUTPATIENT)
Dept: URGENT CARE | Facility: PHYSICIAN GROUP | Age: 28
End: 2023-02-11
Payer: COMMERCIAL

## 2023-02-11 VITALS
OXYGEN SATURATION: 97 % | DIASTOLIC BLOOD PRESSURE: 72 MMHG | HEIGHT: 64 IN | TEMPERATURE: 98.4 F | SYSTOLIC BLOOD PRESSURE: 118 MMHG | BODY MASS INDEX: 38.58 KG/M2 | WEIGHT: 226 LBS | RESPIRATION RATE: 14 BRPM | HEART RATE: 88 BPM

## 2023-02-11 DIAGNOSIS — J45.31 MILD PERSISTENT ASTHMA WITH EXACERBATION: ICD-10-CM

## 2023-02-11 DIAGNOSIS — J06.9 UPPER RESPIRATORY TRACT INFECTION, UNSPECIFIED TYPE: ICD-10-CM

## 2023-02-11 DIAGNOSIS — R07.89 CHEST TIGHTNESS: ICD-10-CM

## 2023-02-11 DIAGNOSIS — J02.9 SORE THROAT: ICD-10-CM

## 2023-02-11 LAB
INT CON NEG: NORMAL
INT CON POS: NORMAL
S PYO AG THROAT QL: NEGATIVE

## 2023-02-11 PROCEDURE — 87651 STREP A DNA AMP PROBE: CPT

## 2023-02-11 PROCEDURE — 93000 ELECTROCARDIOGRAM COMPLETE: CPT

## 2023-02-11 PROCEDURE — 94640 AIRWAY INHALATION TREATMENT: CPT | Mod: 76

## 2023-02-11 PROCEDURE — 99214 OFFICE O/P EST MOD 30 MIN: CPT | Mod: 25

## 2023-02-11 RX ORDER — FLUTICASONE PROPIONATE 50 MCG
1 SPRAY, SUSPENSION (ML) NASAL DAILY
Qty: 16 G | Refills: 0 | Status: SHIPPED | OUTPATIENT
Start: 2023-02-11 | End: 2024-02-28

## 2023-02-11 RX ORDER — ALBUTEROL SULFATE 2.5 MG/3ML
2.5 SOLUTION RESPIRATORY (INHALATION) ONCE
Status: COMPLETED | OUTPATIENT
Start: 2023-02-11 | End: 2023-02-11

## 2023-02-11 RX ORDER — CETIRIZINE HYDROCHLORIDE 10 MG/1
10 TABLET ORAL DAILY
Qty: 30 TABLET | Refills: 0 | Status: SHIPPED | OUTPATIENT
Start: 2023-02-11

## 2023-02-11 RX ORDER — DEXAMETHASONE SODIUM PHOSPHATE 10 MG/ML
8 INJECTION INTRAMUSCULAR; INTRAVENOUS ONCE
Status: COMPLETED | OUTPATIENT
Start: 2023-02-11 | End: 2023-02-11

## 2023-02-11 RX ORDER — METHYLPREDNISOLONE 4 MG/1
TABLET ORAL
Qty: 21 TABLET | Refills: 0 | Status: CANCELLED | OUTPATIENT
Start: 2023-02-11

## 2023-02-11 RX ORDER — ALBUTEROL SULFATE 2.5 MG/3ML
2.5 SOLUTION RESPIRATORY (INHALATION) ONCE
OUTPATIENT
Start: 2023-02-11 | End: 2023-02-14

## 2023-02-11 RX ADMIN — DEXAMETHASONE SODIUM PHOSPHATE 8 MG: 10 INJECTION INTRAMUSCULAR; INTRAVENOUS at 14:56

## 2023-02-11 RX ADMIN — ALBUTEROL SULFATE 2.5 MG: 2.5 SOLUTION RESPIRATORY (INHALATION) at 14:52

## 2023-02-11 RX ADMIN — ALBUTEROL SULFATE 2.5 MG: 2.5 SOLUTION RESPIRATORY (INHALATION) at 14:35

## 2023-02-11 NOTE — PROGRESS NOTES
Subjective:   Pippa Mcgill is a 27 y.o. female who presents for Sinus Problem (Congested, pain with sneezing, or with movement started about 2 days )      HPI:    Patient presents to urgent care with concerns of nasal congestion, sneezing, chest tightness.  She reports history of asthma which is typically well controlled.  She does not use an inhaler anymore.  She reports nonproductive cough.  She reports slight sore throat.  Onset was 3-4 days ago  Mild improvement with rest, NyQuil  Denies fever, chills, night sweats, nausea, vomiting, diarrhea  Denies contact with sick individuals  Reports adequate oral hydration, consistent urinary output.      ROS As above in HPI    Medications:    Current Outpatient Medications on File Prior to Visit   Medication Sig Dispense Refill    DiphenhydrAMINE HCl (BENADRYL ALLERGY PO) Take  by mouth.      ibuprofen (MOTRIN) 200 MG TABS Take 200 mg by mouth every 6 hours as needed.       No current facility-administered medications on file prior to visit.        Allergies:   Nkda [no known drug allergy]    Problem List:   There is no problem list on file for this patient.       Surgical History:  Past Surgical History:   Procedure Laterality Date    COLONOSCOPY  6/14/2012    Performed by MARIAH BIRMINGHAM at SURGERY SAME DAY [facility] ORS    KNEE ARTHROSCOPY  12/22/2011    Performed by PRAVIN ANG at SURGERY [facility]S    TIBIAL OSTEOTOMY  12/22/2011    Performed by PRAVIN ANG at [facility] ORS    LIGAMENT RECONSTRUCTION  12/22/2011    Performed by PRAVIN ANG at SURGERY [facility]    OTHER ORTHOPEDIC SURGERY         Past Social Hx:   Social History     Tobacco Use    Smoking status: Never    Smokeless tobacco: Never   Vaping Use    Vaping Use: Never used   Substance Use Topics    Alcohol use: Yes     Comment: occasionally     Drug use: No          Problem list, medications, and allergies reviewed by myself today in Epic.     Objective:     /72 (BP  "Location: Right arm, Patient Position: Sitting, BP Cuff Size: Adult)   Pulse 88   Temp 36.9 °C (98.4 °F) (Temporal)   Resp 14   Ht 1.626 m (5' 4\")   Wt 103 kg (226 lb)   SpO2 97%   BMI 38.79 kg/m²     Physical Exam  Vitals reviewed.   Constitutional:       Appearance: Normal appearance.   HENT:      Head: Normocephalic and atraumatic.      Right Ear: Tympanic membrane and ear canal normal.      Left Ear: Tympanic membrane and ear canal normal.      Nose: Congestion and rhinorrhea present.      Mouth/Throat:      Mouth: Mucous membranes are moist.      Pharynx: Oropharynx is clear. Posterior oropharyngeal erythema present. No oropharyngeal exudate.   Eyes:      Conjunctiva/sclera: Conjunctivae normal.      Pupils: Pupils are equal, round, and reactive to light.   Cardiovascular:      Rate and Rhythm: Normal rate and regular rhythm.      Heart sounds: Normal heart sounds. No murmur heard.    No friction rub. No gallop.   Pulmonary:      Effort: Pulmonary effort is normal. No respiratory distress.      Breath sounds: Normal breath sounds. No stridor. No wheezing, rhonchi or rales.   Chest:      Chest wall: No tenderness.   Abdominal:      General: Bowel sounds are normal.      Palpations: Abdomen is soft.   Skin:     General: Skin is warm and dry.      Capillary Refill: Capillary refill takes less than 2 seconds.      Findings: No rash.   Neurological:      Mental Status: She is alert and oriented to person, place, and time.       EKG Interpretation    Interpreted by emergency department physician    Rhythm: normal sinus   Rate: normal  Axis: normal  Ectopy: none  Conduction: normal  ST Segments: no acute change and normal  T Waves: no acute change and normal  Q Waves: none    Clinical Impression: no acute changes      Assessment/Plan:     Diagnosis and associated orders:   1. Upper respiratory tract infection, unspecified type  - fluticasone (FLONASE) 50 MCG/ACT nasal spray; Administer 1 Spray into affected " nostril(S) every day.  Dispense: 16 g; Refill: 0    2. Mild persistent asthma with exacerbation  - albuterol (PROVENTIL) 2.5mg/3ml nebulizer solution 2.5 mg  - cetirizine (ZYRTEC) 10 MG Tab; Take 1 Tablet by mouth every day.  Dispense: 30 Tablet; Refill: 0  - dexamethasone (DECADRON) injection (check route below) 8 mg  - albuterol (PROVENTIL) 2.5mg/3ml nebulizer solution 2.5 mg    3. Chest tightness  - albuterol (PROVENTIL) 2.5mg/3ml nebulizer solution 2.5 mg  - EKG - Clinic Performed    4. Sore throat  - POCT Rapid Strep A        Comments/MDM:     Rapid strep is negative, POC influenza and Covid not obtained due to symptoms starting more than 48 hours ago. Patient administered albuterol nebulized treatment for reports chest tightness.  Patient reported slight improvement in chest tightness.  Lungs are cta in all lobes bilaterally.  EKG was obtained, heart rate is 78, sinus rhythm, no ectopy, no ST changes, normal P axis.  Repeat nebulized treatment with administration of 8 mg IM Decadron. Patient reports improvement in ease of breathing and chest discomfort. Supportive measures encouraged: rest, increased oral hydration. Follow up with PCP.         Pt is clinically stable at today's acute urgent care visit.  No acute distress noted. Appropriate for outpatient management at this time.       Discussed DDx, management options (risks,benefits, and alternatives to planned treatment), natural progression and supportive care.  Expressed understanding and the treatment plan was agreed upon. Questions were encouraged and answered   Return to urgent care prn if new or worsening sx or if there is no improvement in condition prn.    Educated in Red flags and indications to immediately call 911 or present to the Emergency Department.   Advised the patient to follow-up with the primary care physician for recheck, reevaluation, and consideration of further management.      Please note that this dictation was created using voice  recognition software. I have made a reasonable attempt to correct obvious errors, but I expect that there are errors of grammar and possibly content that I did not discover before finalizing the note.    My total time spent caring for the patient on the day of the encounter was 45 minutes.     This note was electronically signed by Megan Martinez DNP

## 2023-10-02 ENCOUNTER — OFFICE VISIT (OUTPATIENT)
Dept: URGENT CARE | Facility: PHYSICIAN GROUP | Age: 28
End: 2023-10-02
Payer: COMMERCIAL

## 2023-10-02 VITALS
WEIGHT: 234 LBS | OXYGEN SATURATION: 99 % | TEMPERATURE: 96.7 F | DIASTOLIC BLOOD PRESSURE: 68 MMHG | SYSTOLIC BLOOD PRESSURE: 108 MMHG | BODY MASS INDEX: 39.95 KG/M2 | HEART RATE: 92 BPM | RESPIRATION RATE: 20 BRPM | HEIGHT: 64 IN

## 2023-10-02 DIAGNOSIS — M54.12 CERVICAL RADICULOPATHY: ICD-10-CM

## 2023-10-02 DIAGNOSIS — R07.89 ANTERIOR CHEST WALL PAIN: ICD-10-CM

## 2023-10-02 PROCEDURE — 93000 ELECTROCARDIOGRAM COMPLETE: CPT

## 2023-10-02 PROCEDURE — 3078F DIAST BP <80 MM HG: CPT

## 2023-10-02 PROCEDURE — 99214 OFFICE O/P EST MOD 30 MIN: CPT

## 2023-10-02 PROCEDURE — 3074F SYST BP LT 130 MM HG: CPT

## 2023-10-02 RX ORDER — PREDNISONE 10 MG/1
20 TABLET ORAL DAILY
Qty: 10 TABLET | Refills: 0 | Status: SHIPPED | OUTPATIENT
Start: 2023-10-02 | End: 2023-10-07

## 2023-10-02 RX ORDER — CYCLOBENZAPRINE HCL 5 MG
5-10 TABLET ORAL
Qty: 15 TABLET | Refills: 0 | Status: SHIPPED | OUTPATIENT
Start: 2023-10-02 | End: 2024-02-28

## 2023-10-02 NOTE — LETTER
October 2, 2023    To Whom It May Concern:         This is confirmation that Pippa Mcgill attended her scheduled appointment with LEAH Cobian on 10/02/23. May return to work 10/3/23         If you have any questions please do not hesitate to call me at the phone number listed below.    Sincerely,          SANDY Cobian.  [phone]

## 2023-10-02 NOTE — PROGRESS NOTES
"Chief Complaint   Patient presents with    Chest Pain     2x days;   Pt states when she is active she doesn't feel it, but when she goes to lay down she does.          Subjective:   HISTORY OF PRESENT ILLNESS: Pippa Mcgill is a 28 y.o. female who presents for left arm and anterior shoulder pain that started a few nights ago.  She reports that the pain happens mostly at night time.  Pt reports that during the day when she is walking around, she does not feel the pain.  Describes it as a aching pain, if she moves suddenly it is a shooting pain.  Reports she lifts heavy boxes at work, denies neck pain.  Reports some numbness and tingling currently but no pain at this  time   Denies fevers, chills, body aches, SOB  No hx of heart disease, no family hx of anyone dying at a young age of heart related causes.  Otherwise healthy        Medications, Allergies, current problem list, Social and Family history reviewed today in Epic.     Objective:     /68 (BP Location: Right arm, Patient Position: Sitting, BP Cuff Size: Adult)   Pulse 92   Temp 35.9 °C (96.7 °F) (Temporal)   Resp 20   Ht 1.626 m (5' 4\")   Wt 106 kg (234 lb)   SpO2 99%     Physical Exam  Vitals reviewed.   Constitutional:       Appearance: Normal appearance.   HENT:      Mouth/Throat:      Mouth: Mucous membranes are moist.   Cardiovascular:      Rate and Rhythm: Normal rate.   Pulmonary:      Effort: Pulmonary effort is normal.   Musculoskeletal:        Arms:       Comments: Reports increased pain to the medial aspect of her left upper arm when I raise her shoulder 90* over head.  The pain/tingle shoot to her lateral elbow but does not travel down to her fingers.  No TTP to palpation.    Skin:     General: Skin is warm and dry.   Neurological:      Mental Status: She is alert and oriented to person, place, and time.   Psychiatric:         Mood and Affect: Mood normal.          Assessment/Plan:     Diagnosis and associated orders    I personally " reviewed prior external notes and test results pertinent to today's visit.     1. Cervical radiculopathy  EKG - Clinic Performed    cyclobenzaprine (FLEXERIL) 5 mg tablet    predniSONE (DELTASONE) 10 MG Tab      2. Anterior chest wall pain  EKG - Clinic Performed    cyclobenzaprine (FLEXERIL) 5 mg tablet    predniSONE (DELTASONE) 10 MG Tab        EKG:   I have independently interpreted this EKG  NSR rate: 88  , normal axis, normal intervals, no evidence of ischemia or hypertrophy.    IMPRESSION: Patient is non-toxic appearing and suitable for outpatient care at this time.   Exam findings reassuring with stable vital signs, EKG was NSR without an ST changes.  This appears to be musculoskeletal but we did have a long discussion regarding cardiac causes of arm pain,  She understands that any changes in her symptoms such as continuous chest pain or SOB she should go to the ED.      Differential diagnosis discussed and understood.      Between 30-39 minutes was spent caring for this patient on the day of the encounter which included face-to-face time, discussing the diagnosis, medical management, follow-up, emergency room precautions and completion of the chart. This does not include time spent on separately billable procedures/tests.     Educated on red flag symptoms and Instructed patient to return to Urgent Care or nearest Emergency Department if symptoms fail to improve, for any change in condition, further concerns, or new concerning symptoms. Patient states understanding of the plan of care and discharge instructions.  They are discharged in stable condition.         Please note that this dictation was created using voice recognition software. I have made a reasonable attempt to correct obvious errors, but I expect that there are errors of grammar and possibly content that I did not discover before finalizing the note.    This note was electronically signed by LEAH Cobian

## 2024-02-28 ENCOUNTER — OFFICE VISIT (OUTPATIENT)
Dept: URGENT CARE | Facility: PHYSICIAN GROUP | Age: 29
End: 2024-02-28

## 2024-02-28 VITALS
OXYGEN SATURATION: 99 % | BODY MASS INDEX: 40.12 KG/M2 | WEIGHT: 235 LBS | RESPIRATION RATE: 14 BRPM | DIASTOLIC BLOOD PRESSURE: 62 MMHG | SYSTOLIC BLOOD PRESSURE: 124 MMHG | TEMPERATURE: 99.1 F | HEIGHT: 64 IN | HEART RATE: 80 BPM

## 2024-02-28 DIAGNOSIS — Z02.4 ENCOUNTER FOR COMMERCIAL DRIVER MEDICAL EXAMINATION (CDME): ICD-10-CM

## 2024-02-28 PROCEDURE — 7100 PR DOT PHYSICAL: Performed by: NURSE PRACTITIONER

## 2024-02-28 NOTE — PROGRESS NOTES
S) Here today for  physical exam    O) See history / physical forms attached    A)   1. Encounter for  medical examination (CDME)            P) Pt is qualified for 2 year medical examination certificate   Must wear corrective lenses.    Expires 02/28/2026

## 2024-08-02 ENCOUNTER — OCCUPATIONAL MEDICINE (OUTPATIENT)
Dept: URGENT CARE | Facility: PHYSICIAN GROUP | Age: 29
End: 2024-08-02
Payer: COMMERCIAL

## 2024-08-02 VITALS
HEART RATE: 93 BPM | RESPIRATION RATE: 16 BRPM | WEIGHT: 210 LBS | HEIGHT: 64 IN | BODY MASS INDEX: 35.85 KG/M2 | TEMPERATURE: 97.2 F | SYSTOLIC BLOOD PRESSURE: 114 MMHG | DIASTOLIC BLOOD PRESSURE: 82 MMHG | OXYGEN SATURATION: 98 %

## 2024-08-02 DIAGNOSIS — L03.116 CELLULITIS OF LEFT LOWER EXTREMITY: ICD-10-CM

## 2024-08-02 DIAGNOSIS — Z02.6 ENCOUNTER RELATED TO WORKER'S COMPENSATION CLAIM: ICD-10-CM

## 2024-08-02 DIAGNOSIS — T63.484A INSECT STINGS, UNDETERMINED INTENT, INITIAL ENCOUNTER: ICD-10-CM

## 2024-08-02 PROCEDURE — 3079F DIAST BP 80-89 MM HG: CPT | Performed by: NURSE PRACTITIONER

## 2024-08-02 PROCEDURE — 3074F SYST BP LT 130 MM HG: CPT | Performed by: NURSE PRACTITIONER

## 2024-08-02 PROCEDURE — 99213 OFFICE O/P EST LOW 20 MIN: CPT | Performed by: NURSE PRACTITIONER

## 2024-08-02 RX ORDER — CEPHALEXIN 500 MG/1
500 CAPSULE ORAL 4 TIMES DAILY
Qty: 20 CAPSULE | Refills: 0 | Status: SHIPPED | OUTPATIENT
Start: 2024-08-02 | End: 2024-08-07

## 2024-08-02 RX ORDER — DEXAMETHASONE SODIUM PHOSPHATE 10 MG/ML
10 INJECTION INTRAMUSCULAR; INTRAVENOUS ONCE
Status: COMPLETED | OUTPATIENT
Start: 2024-08-02 | End: 2024-08-02

## 2024-08-02 RX ADMIN — DEXAMETHASONE SODIUM PHOSPHATE 10 MG: 10 INJECTION INTRAMUSCULAR; INTRAVENOUS at 17:34

## 2024-08-02 NOTE — LETTER
PHYSICIAN’S AND CHIROPRACTIC PHYSICIAN'S   PROGRESS REPORT CERTIFICATION OF DISABILITY Claim Number:     Social Security Number:    Patient’s Name:Pippa Mcgill Date of Injury: 8/1/2024   Employer: [employer] Name of [employer] (if applicable)      Patient’s Job Description/Occupation:        Previous Injuries/Diseases/Surgeries Contributing to the Condition:  None        Diagnosis:    1. Insect stings, undetermined intent, initial encounter  dexamethasone (Decadron) injection (check route below) 10 mg    cephALEXin (KEFLEX) 500 MG Cap      2. Cellulitis of left lower extremity  dexamethasone (Decadron) injection (check route below) 10 mg    cephALEXin (KEFLEX) 500 MG Cap                      Related to the Industrial Injury? 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         Objective Medical Findings:VSS. BS-CTAB No wheezing  Skin:Hot to touch in LLE. Brisk cap refill. + Erythema (with induration LLE) present. Injury: insect sting to left lower leg with bullae and vescles. + erythema and induration from ankle to calf.  Gait is smooth and steady              None - Discharged                         Stable  Yes                 Ratable  No       Generally Improved                        Condition Worsened                 Condition Same  May Have Suffered a Permanent Disability  No     Treatment Plan:             No Change in Therapy                 PT/OT Prescribed                     Medication May be Used While Working       Case Management                        PT/OT Discontinued    Consultation    Further Diagnostic Studies:                               Prescription(s)                   Keflex PO - Dexamethazone in clinic         Released to FULL DUTY /No Restrictions on (Date):  From:      Certified TOTALLY TEMPORARILY DISABLED (Indicate Dates) From:   To:    X  Released to RES TRICTED/Modified Duty on (Date): From: 8/2/2024 To: 8/5/2024                                                                Restrictions Are:  Temporary     No Sitting                               No Standing                   No Pulling                  Other: Restriction WALKING: < 4 hour/ day.     No Bending at Waist           No Stooping                    No Lifting       No Carrying                         X  No Walking                Lifting Restricted to (lbs.):       No Pushing                            No Climbing                   No Reaching Above Shoulders   Date of Next Visit:  8/5/2024 Date of this Exam:8/2/2024 Physician/Chiropractic Physician Name:Rola Alexander, A.PDeshaunN. Physician/Chiropractic Physician Signature:  Rafael Vogel DO MPH   D-39 (Rev. 2/24)

## 2024-08-02 NOTE — LETTER
"    EMPLOYEE’S CLAIM FOR COMPENSATION/ REPORT OF INITIAL TREATMENT  FORM C-4  PLEASE TYPE OR PRINT    EMPLOYEE’S CLAIM - PROVIDE ALL INFORMATION REQUESTED   First Name                    FUENTES Das                  Last Name  Artem Birthdate                    1995                Sex  Female Claim Number (Insurer’s Use Only)     Home Address  [address] Age  29 y.o. Height  1.626 m (5' 4\") Weight  95.3 kg (210 lb) Social Security Number     [facility] [place]  [address] Telephone  [phone] (home)    Mailing Address  [address] [facility] Zip  [address] Primary Language Spoken  English    INSURER   THIRD-PARTY   [employer]are [employer]iz[employer]   Employee's Occupation (Job Title) When Injury or Occupational Disease Occurred      Employer's Name/Company Name  [employer]  Telephone  [phone]    Office Mail Address (Number and Street)  [address] [facility]     Date of Injury (if applicable) 8/1/2024               Hours Injury (if applicable)   Date Employer Notified  8/1/2024 Last Day of Work after Injury or Occupational Disease  8/2/2024 Supervisor to Whom Injury Reported  Branden   Address or Location of Accident (if applicable)  Work [1]   What were you doing at the time of accident? (if applicable)  walking    How did this injury or occupational disease occur? (Be specific and answer in detail. Use additional sheet if necessary)  walking and a bee stung me in the left calf   If you believe that you have an occupational disease, when did you first have knowledge of the disability and its relationship to your employment?  N/A Witnesses to the Accident (if applicable)  N/A      Nature of Injury or Occupational Disease  Puncture  Part(s) of Body Injured or Affected  Lower Leg (L) N/A N/A    I CERTIFY THAT THE ABOVE IS TRUE AND CORRECT TO T HE BEST OF MY KNOWLEDGE AND THAT I " HAVE PROVIDED THIS INFORMATION IN ORDER TO OBTAIN THE BENEFITS OF [place]CC[place], OR [place]).  I HEREBY AUTHORIZE ANY PHYSICIAN, CHIROPRACTOR, SURGEON, PRACTITIONER OR ANY OTHER PERSON, ANY HOSPITAL, INCLUDING [facility] OR [facility], ANY  MEDICAL SERVICE ORGANIZATION, ANY INSURANCE COMPANY, OR OTHER INSTITUTION OR ORGANIZATION TO RELEASE TO EACH OTHER, ANY MEDICAL OR OTHER INFORMATION, INCLUDING BENEFITS PAID OR PAYABLE, PERTINENT TO THIS INJURY OR DISEASE, EXCEPT INFORMATION RELATIVE TO DIAGNOSIS, TREATMENT AND/OR COUNSELING FOR AIDS, PSYCHOLOGICAL CONDITIONS, ALCOHOL OR CONTROLLED SUBSTANCES, FOR WHICH I MUST GIVE SPECIFIC AUTHORIZATION.  A PHOTOSTAT OF THIS AUTHORIZATION SHALL BE VALID AS THE ORIGINAL.     Date   Place Employee’s Original or  *Electronic Signature   THIS REPORT MUST BE COMPLETED AND MAILED WITHIN 3 WORKING DAYS OF TREATMENT   Place  [facility]    Name of Facility  [facility]   Date 8/2/2024 Diagnosis and Description of Injury or Occupational Disease  (T63.484A) Insect stings, undetermined intent, initial encounter  (L03.116) Cellulitis of left lower extremity  Diagnoses of Insect stings, undetermined intent, initial encounter and Cellulitis of left lower extremity were pertinent to this visit. Is there evidence that the injured employee was under the influence of alcohol and/or another controlled substance at the time of accident?  []No  [] Yes (if yes, please explain)   Hour 5:19 PM  No   Treatment: RX antibiotic, dexamethazone PO , OTC antihistamine, warm / cold packs prn pain, Work Restrictions     Have you advised the patient to remain off work five days or more?   [] Yes Indicate dates: From   To    [] No      If no, is the injured employee capable of: [] full duty [] modified duty                     If modified duty, specify any limitations / restrictions:  See [place] D39                                                                                                                                                                                                                                                                                                                                                                                                                 X-Ray Findings:   Comments:NA    From information given by the employee, together with medical evidence, can you directly connect this injury or occupational disease as job incurred?  []Yes   [] No Yes    Is additional medical care by a physician indicated? []Yes [] No  Yes    Do you know of any previous injury or disease contributing to this condition or occupational disease? []Yes [] No (Explain if yes)                          No   Date  8/2/2024 Print Health Care Provider’s Name  LENY Livingston I certify that the employer’s copy of  this form was delivered to the employer on:   Address  [address] INSURER'S USE ONLY                       [place]  [address] Provider’s Tax ID Number  [id number]   Telephone  Dept: [phone]    Health Care Provider’s Original or Electronic Signature  [id number] SamsPMIMI Degree (MD,DO, DC,PA-C,APRN)  APRN  Choose (if applicable)      ORIGINAL - TREATING HEALTHCARE PROVIDER PAGE 2 - INSURER/TPA PAGE 3 - EMPLOYER PAGE 4 - EMPLOYEE             Form C-4 (rev.08/23)

## 2024-08-03 ASSESSMENT — ENCOUNTER SYMPTOMS
CHILLS: 0
SHORTNESS OF BREATH: 0
COUGH: 0

## 2024-08-03 NOTE — PROGRESS NOTES
"Subjective     Pippa Mcgill is a 29 y.o. female who presents with Work-Related Injury (DOI 8/1/Bee sting reaction)            HPI DOI 08/01/24   TORRI she was walking and was stung by a bee (insect) on her left calf.   She believes she is having an allergic reaction.  A blister has formed. Leg is more red and swollen now. Tx ibuprofen.     Review of Systems   Constitutional:  Negative for chills.   Respiratory:  Negative for cough and shortness of breath.               Objective     /82   Pulse 93   Temp 36.2 °C (97.2 °F) (Temporal)   Resp 16   Ht 1.626 m (5' 4\")   Wt 95.3 kg (210 lb)   SpO2 98%   BMI 36.05 kg/m²      Physical Exam  Vitals reviewed.   Pulmonary:      Breath sounds: Normal breath sounds.   Skin:     General: Skin is warm.      Capillary Refill: Capillary refill takes less than 2 seconds.      Findings: Erythema (with induration LLE) present. Injury: insect sting to left lower leg with bullae and vescles.     Comments: Gait is smooth and steady                              Assessment & Plan        1. Insect stings, undetermined intent, initial encounter  dexamethasone (Decadron) injection (check route below) 10 mg    cephALEXin (KEFLEX) 500 MG Cap      2. Cellulitis of left lower extremity  dexamethasone (Decadron) injection (check route below) 10 mg    cephALEXin (KEFLEX) 500 MG Cap      3. Encounter related to worker's compensation claim                  See [place] D39        I have spent 30 minutes on the care of Pippa Mcgill.  This includes preparing for the urgent care visit. This time includes review of previous visits/ documents, obtaining HPI, examination and evaluation of patient, ordering and interpretation of labs, imaging, tests, medical management, counseling, education and documentation.              "

## 2024-08-05 ENCOUNTER — OCCUPATIONAL MEDICINE (OUTPATIENT)
Dept: URGENT CARE | Facility: PHYSICIAN GROUP | Age: 29
End: 2024-08-05
Payer: COMMERCIAL

## 2024-08-05 VITALS
SYSTOLIC BLOOD PRESSURE: 122 MMHG | BODY MASS INDEX: 34.23 KG/M2 | WEIGHT: 213 LBS | DIASTOLIC BLOOD PRESSURE: 78 MMHG | HEART RATE: 83 BPM | RESPIRATION RATE: 16 BRPM | OXYGEN SATURATION: 98 % | HEIGHT: 66 IN | TEMPERATURE: 98.3 F

## 2024-08-05 DIAGNOSIS — T63.484D: ICD-10-CM

## 2024-08-05 PROCEDURE — 99213 OFFICE O/P EST LOW 20 MIN: CPT | Performed by: PHYSICIAN ASSISTANT

## 2024-08-05 PROCEDURE — 3074F SYST BP LT 130 MM HG: CPT | Performed by: PHYSICIAN ASSISTANT

## 2024-08-05 PROCEDURE — 3078F DIAST BP <80 MM HG: CPT | Performed by: PHYSICIAN ASSISTANT

## 2024-08-05 NOTE — LETTER
PHYSICIAN’S AND CHIROPRACTIC PHYSICIAN'S   PROGRESS REPORT CERTIFICATION OF DISABILITY Claim Number:     Social Security Number:    Patient’s Name:Pippa Mcgill Date of Injury: 8/1/2024   Employer: [employer] Name of O (if applicable)      Patient’s Job Description/Occupation:        Previous Injuries/Diseases/Surgeries Contributing to the Condition:  None         Diagnosis:    1. Insect stings, undetermined intent, subsequent encounter                        Related to the Industrial Injury? Yes     Explain:HPI DOI 08/01/24   TORRI she was walking and was stung by a bee (insect) on her left calf.   She believes she is having an allergic reaction.  A blister has formed. Leg is more red and swollen now. Tx ibuprofen.   Improvement.           Objective Medical Findings:Left lower extremity: two small blisters. Trace erythema. No induration. Minimal swelling. No pain. No issues with walking. Has been protecting area.        X  None - Discharged                         Stable  Yes                 Ratable  Yes     X  Generally Improved                        Condition Worsened                 Condition Same  May Have Suffered a Permanent Disability  No     Treatment Plan:             No Change in Therapy                 PT/OT Prescribed                   X  Medication May be Used While Working       Case Management                      X  PT/OT Discontinued    Consultation    Further Diagnostic Studies:                             X  Prescription(s)                   Released full duty. Discharge MMI        X  Released to FULL DUTY /No Restrictions on (Date):  From:      Certified TOTALLY TEMPORARILY DISABLED (Indicate Dates) From:   To:      Released to RES TRICTED/Modified Duty on (Date): From:   To:                                                                 Restrictions Are:  Temporary     No Sitting                               No Standing                   No Pulling                   Other:      No Bending at Waist           No Stooping                    No Lifting       No Carrying                           No Walking                Lifting Restricted to (lbs.):       No Pushing                            No Climbing                   No Reaching Above Shoulders   Date of Next Visit:    Date of this Exam:8/5/2024 Physician/Chiropractic Physician Name:Hector Birmingham P.A.-C. Physician/Chiropractic Physician Signature:  Rafael Vogel DO MPH   D-39 (Rev. 2/24)

## 2024-08-05 NOTE — PROGRESS NOTES
"Subjective:     Pippa Mcgill is a 29 y.o. female who presents for Follow-Up (WC, Bee sting )    HPI DOI 08/01/24   TORRI she was walking and was stung by a bee (insect) on her left calf.   She believes she is having an allergic reaction.  A blister has formed. Leg is more red and swollen now. Tx ibuprofen.     Patient treated with decadron and keflex from initial visit     2nd visit 8/5/24: Patient notes improvement. No pain. No drainage or discharge. She has been keeping the area clean and protected with a bandaid. Still has a couple blisters to the area. Occasional itching but not so much anymore. No other symptom. No other complaints. She has no issues walking. She states she is ready to be released full duty.           PMH:   No pertinent past medical history to this problem  MEDS:  Medications were reviewed in EMR  ALLERGIES:  Allergies were reviewed in EMR  SOCH[employer]:  Works as a .   FH:   No pertinent family history to this problem       Objective:     /78   Pulse 83   Temp 36.8 °C (98.3 °F) (Temporal)   Resp 16   Ht 1.676 m (5' 6\")   Wt 96.6 kg (213 lb)   SpO2 98%   BMI 34.38 kg/m²     Left lower extremity: two small blisters. Trace erythema. No induration. Minimal swelling. No pain. No issues with walking. Has been protecting area.      Assessment/Plan:       1. Insect stings, undetermined intent, subsequent encounter      FROM   TO            Improvement.   Finishing antibiotics. Ice application, wound care, elevation, hydrocortisone cream.     Discharge MMI release full duty     Differential diagnosis, natural history, supportive care, and indications for immediate follow-up discussed.    "